# Patient Record
(demographics unavailable — no encounter records)

---

## 2024-11-18 NOTE — HEALTH RISK ASSESSMENT
[Yes] : Yes [Monthly or less (1 pt)] : Monthly or less (1 point) [1 or 2 (0 pts)] : 1 or 2 (0 points) [Never (0 pts)] : Never (0 points) [No] : In the past 12 months have you used drugs other than those required for medical reasons? No [No falls in past year] : Patient reported no falls in the past year [0] : 2) Feeling down, depressed, or hopeless: Not at all (0) [PHQ-2 Negative - No further assessment needed] : PHQ-2 Negative - No further assessment needed [Audit-CScore] : 1 [[id number]] : 0

## 2024-11-18 NOTE — HISTORY OF PRESENT ILLNESS
[FreeTextEntry1] : ANA LAURA [de-identified] : Patient is a 52-year-old female with past medical history as below who presents for an Annual Wellness Visit.  Patient has recent lost 6 pounds, and her BP is well controlled with 124/80 reading in the office today. Patient is exercising 5-6 days a week and following a balanced diet.   Patient underwent NovaSure (endometrial ablation), MyoSure, Hysterocopy, and D&C in May. She received 2 units of PRBC's during her hospitalization as well as an iron infusion.   She regularly follows with endocrinologist, Dr. Tere Simmons, for follow up of thyroid nodules.  Patient notes she has been intermittently fasting, only eating from 12PM-8PM. Patient is up to date with GYN and received screening mammogram and breast US in October 2024. Patient inquired about screening colonoscopy. Last colonoscopy was performed 7/2020 and she will be due in 2025. Patient is up to date with Covid vaccines but does not annually receive the flu shot. Denies Tdap and Shingles.

## 2024-11-18 NOTE — PLAN
[FreeTextEntry1] : Blood work and UA done today: CBC, CMP, Lipid panel, TSH, Vitamin D, A1C EKG attempted but unable to get adequate tracing due to technical issues. Cardiology - Hyperlipidemia - continue low cholesterol/low fat diet and no flush niacin 500 mg BID - check FLP  -124/80 BP reading today in the office. Gastroenterology -prescription given for Colonoscopy July 2025- f/u with Gastroenterologist.  Hematology iron deficiency anemia secondary to menorrhagia-check CBC and iron studies Endocrinology - Thyroid nodules - Thyroid US performed q. 6 months as per patient - continue to follow up with endocrinologist, Dr. Simmons - History of Vitamin D deficiency - previously on Vitamin D 29591 IU weekly - check Vitamin D  Gynecology - Follow up with GYN, Dr. Olivas in 11/2024 Dermatology - Acne - continue Tretinoin 0.025% External Cream as directed; continue Clindamycin Phos-Benzoyl Perox Gel - continue to follow up with dermatologist, Dr. Mack Immunization -Flu vaccine- denied. -Tdap- will get in near future -Shingles- advised and discussed to get Shingles with Patient >50 years old.  Fasting bloodwork done in office, EKG done in office
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
## 2024-11-18 NOTE — END OF VISIT
[FreeTextEntry3] : I, Krunal De Paz, acted solely as scribe for Dr. Domenic Byers DO on this date 11/18/2024.  All medical record entries made by the Scribe were at my, Dr. Domenic Byers DO direction and personally dictated by me on 11/18/2024, I have reviewed the chart and agree that the record accurately reflects my personal performance of the history, physical exam, assessment and plan. I have also personally directed, reviewed and agreed with the chart.   [Time Spent: ___ minutes] : I have spent [unfilled] minutes of time on the encounter which excludes teaching and separately reported services.
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
## 2024-11-18 NOTE — PLAN
[FreeTextEntry1] : Blood work and UA done today: CBC, CMP, Lipid panel, TSH, Vitamin D, A1C EKG attempted but unable to get adequate tracing due to technical issues. Cardiology - Hyperlipidemia - continue low cholesterol/low fat diet and no flush niacin 500 mg BID - check FLP  -124/80 BP reading today in the office. Gastroenterology -prescription given for Colonoscopy July 2025- f/u with Gastroenterologist.  Hematology iron deficiency anemia secondary to menorrhagia-check CBC and iron studies Endocrinology - Thyroid nodules - Thyroid US performed q. 6 months as per patient - continue to follow up with endocrinologist, Dr. Simmons - History of Vitamin D deficiency - previously on Vitamin D 14266 IU weekly - check Vitamin D  Gynecology - Follow up with GYN, Dr. Olivas in 11/2024 Dermatology - Acne - continue Tretinoin 0.025% External Cream as directed; continue Clindamycin Phos-Benzoyl Perox Gel - continue to follow up with dermatologist, Dr. Mack Immunization -Flu vaccine- denied. -Tdap- will get in near future -Shingles- advised and discussed to get Shingles with Patient >50 years old.  Fasting bloodwork done in office, EKG done in office

## 2024-11-18 NOTE — HEALTH RISK ASSESSMENT
[Yes] : Yes [Monthly or less (1 pt)] : Monthly or less (1 point) [1 or 2 (0 pts)] : 1 or 2 (0 points) [Never (0 pts)] : Never (0 points) [No] : In the past 12 months have you used drugs other than those required for medical reasons? No [No falls in past year] : Patient reported no falls in the past year [0] : 2) Feeling down, depressed, or hopeless: Not at all (0) [PHQ-2 Negative - No further assessment needed] : PHQ-2 Negative - No further assessment needed [Audit-CScore] : 1 [[id number]] : 0

## 2024-11-18 NOTE — HISTORY OF PRESENT ILLNESS
[FreeTextEntry1] : ANA LAURA [de-identified] : Patient is a 52-year-old female with past medical history as below who presents for an Annual Wellness Visit.  Patient has recent lost 6 pounds, and her BP is well controlled with 124/80 reading in the office today. Patient is exercising 5-6 days a week and following a balanced diet.   Patient underwent NovaSure (endometrial ablation), MyoSure, Hysterocopy, and D&C in May. She received 2 units of PRBC's during her hospitalization as well as an iron infusion.   She regularly follows with endocrinologist, Dr. Tere Simmons, for follow up of thyroid nodules.  Patient notes she has been intermittently fasting, only eating from 12PM-8PM. Patient is up to date with GYN and received screening mammogram and breast US in October 2024. Patient inquired about screening colonoscopy. Last colonoscopy was performed 7/2020 and she will be due in 2025. Patient is up to date with Covid vaccines but does not annually receive the flu shot. Denies Tdap and Shingles.